# Patient Record
Sex: FEMALE | HISPANIC OR LATINO | Employment: UNEMPLOYED | URBAN - METROPOLITAN AREA
[De-identification: names, ages, dates, MRNs, and addresses within clinical notes are randomized per-mention and may not be internally consistent; named-entity substitution may affect disease eponyms.]

---

## 2024-01-24 ENCOUNTER — INITIAL PRENATAL (OUTPATIENT)
Dept: OBSTETRICS AND GYNECOLOGY | Facility: CLINIC | Age: 39
End: 2024-01-24
Payer: COMMERCIAL

## 2024-01-24 VITALS — SYSTOLIC BLOOD PRESSURE: 122 MMHG | DIASTOLIC BLOOD PRESSURE: 68 MMHG | WEIGHT: 174 LBS

## 2024-01-24 DIAGNOSIS — E07.9 THYROID DYSFUNCTION IN PREGNANCY, ANTEPARTUM: ICD-10-CM

## 2024-01-24 DIAGNOSIS — O99.280 THYROID DYSFUNCTION IN PREGNANCY, ANTEPARTUM: ICD-10-CM

## 2024-01-24 DIAGNOSIS — K29.50 CHRONIC GASTRITIS WITHOUT BLEEDING, UNSPECIFIED GASTRITIS TYPE: ICD-10-CM

## 2024-01-24 DIAGNOSIS — Z36.0 ENCOUNTER FOR ANTENATAL SCREENING FOR CHROMOSOMAL ANOMALIES: ICD-10-CM

## 2024-01-24 DIAGNOSIS — O09.519 ADVANCED MATERNAL AGE, PRIMIGRAVIDA, ANTEPARTUM: ICD-10-CM

## 2024-01-24 DIAGNOSIS — Z11.3 SCREEN FOR SEXUALLY TRANSMITTED DISEASES: ICD-10-CM

## 2024-01-24 DIAGNOSIS — Z13.71 SCREENING FOR GENETIC DISEASE CARRIER STATUS: ICD-10-CM

## 2024-01-24 DIAGNOSIS — N91.2 ABSENT MENSES: Primary | ICD-10-CM

## 2024-01-24 DIAGNOSIS — O21.9 NAUSEA AND VOMITING DURING PREGNANCY: ICD-10-CM

## 2024-01-24 DIAGNOSIS — Z12.4 SCREENING FOR MALIGNANT NEOPLASM OF CERVIX: ICD-10-CM

## 2024-01-24 LAB
B-HCG UR QL: POSITIVE
EXPIRATION DATE: ABNORMAL
INTERNAL NEGATIVE CONTROL: ABNORMAL
INTERNAL POSITIVE CONTROL: ABNORMAL
Lab: ABNORMAL

## 2024-01-24 RX ORDER — LEVOTHYROXINE SODIUM 0.1 MG/1
100 TABLET ORAL DAILY
COMMUNITY

## 2024-01-24 RX ORDER — OMEPRAZOLE 20 MG/1
20 CAPSULE, DELAYED RELEASE ORAL DAILY
Qty: 30 CAPSULE | Refills: 5 | Status: SHIPPED | OUTPATIENT
Start: 2024-01-24

## 2024-01-24 RX ORDER — DOXYLAMINE SUCCINATE AND PYRIDOXINE HYDROCHLORIDE, DELAYED RELEASE TABLETS 10 MG/10 MG 10; 10 MG/1; MG/1
1 TABLET, DELAYED RELEASE ORAL 2 TIMES DAILY
Qty: 60 TABLET | Refills: 2 | Status: SHIPPED | OUTPATIENT
Start: 2024-01-24

## 2024-01-24 NOTE — PROGRESS NOTES
Chief Complaint  Initial Prenatal Visit (Patient is here for initial prenatal 11w5d, LMP 11/3/23,MARIELENA 8/9/24)    Entirety of today's encounter including patient interview, exam, and counseling performed with the aid of a medical  via the telephone.     Krunal Dinero presents to [facility]  History of Present Illness  Patient here for initial prenatal visit.  LMP was 11/3/2023.  She reports having some nausea with occasional episodes of emesis.  She reports a previous history of gastritis, and she says she feels this is making the nausea worse.  She has tried taking Mylanta at home.  She has also had some mild pelvic cramping every now and then.  She also states that about 2-3 days ago she had a very brief and scant episode of spotting.  She is otherwise without complaints.    This was the patient's first pregnancy.    Past Medical History:   Diagnosis Date    Hypothyroidism      Past Surgical History:   Procedure Laterality Date    OVARIAN CYST SURGERY Left      Social History     Tobacco Use    Smoking status: Never    Smokeless tobacco: Never   Vaping Use    Vaping Use: Never used   Substance Use Topics    Alcohol use: Never    Drug use: Never     Family History   Problem Relation Age of Onset    Ovarian cancer Mother     Colon cancer Mother     Breast cancer Neg Hx     Uterine cancer Neg Hx      Current Outpatient Medications on File Prior to Visit   Medication Sig    levothyroxine (SYNTHROID, LEVOTHROID) 100 MCG tablet Take 1 tablet by mouth Daily.     No current facility-administered medications on file prior to visit.     No Known Allergies    Review of systems:  Constitutional: No fevers, chills, sweats   Eye: No recent visual problems, denies blurry vision   HEENT: No ear pain, nasal congestion, sore throat, voice changes   Respiratory: No shortness of breath, cough, pain on breathing   Cardiovascular: No Chest pain,  palpitations   Gastrointestinal: Pos nausea, vomiting; no diarrhea, constipation   Genitourinary: No hematuria, dysuria, lesions on genitalia   Hema/Lymph: Negative for bruising, no edema   Endocrine: Negative for excessive thirst, excessive hunger, heat or cold intolerance   Musculoskeletal: No joint pain, muscle pain, decreased range of motion   Integumentary: No rash, pruritus, abrasions, lesions   Neurologic: No weakness, numbness, headaches   Psychiatric: No anxiety, depression, mood changes         Objective   Vital Signs:  /68   Wt 78.9 kg (174 lb)   There is no height or weight on file to calculate BMI.             Physical Exam   Exam performed in the presence of a female chaperone  Patient has provided verbal consent to proceed with exam.    Gen: No acute distress, awake and oriented times three  HENT: Normocephalic, atraumatic, Moist mucous membranes  Eyes: PERRLA, EOMI  Lungs: Normal work of breathing, lungs clear bilaterally  Abdomen: soft, nontender, no masses or hernia, no hepatosplenomegaly, non distended, normoactive bowel sounds  Normal external female genitalia, no lesions  Urethra: Normal meatus, no caruncle  Bladder: nontender  Vagina: No blood or discharge  Cervix: No cervical motion tenderness, no lesions, no active bleeding, nonfriable  Uterus: Anteverted, normal size and shape, nontender  Adnexa: No masses or tenderness  External anal exam: Normal appearance, no lesions or hemorrhoids  Rectal: Deferred  Skin: Warm and dry, no rashes  Psych: Good judgement and insight, normal affect and mood  Neuro: CN 2-12 intact, no gross deficits    Result Review :            Initial Prenatal on 01/24/2024   Component Date Value Ref Range Status    HCG, Urine, QL 01/24/2024 Positive (A)  Negative Final    Lot Number 01/24/2024 [id number]   Final    Internal Positive Control 01/24/2024 Passed  Positive, Passed Final    Internal Negative Control 01/24/2024 Passed  Negative, Passed Final    Expiration  Date 2024   Final              Assessment and Plan     Diagnoses and all orders for this visit:    1. Absent menses (Primary)  -     POC Pregnancy, Urine    Initial prenatal counseling performed today. Educational handouts on prenatal care provided to patient. Discussed frequency of visits, lab testing, OTC medications, physical activity, exercise, dietary restrictions, potential exposures (COVID, Zika, Toxo, etc). Hospital and call coverage system discussed. Pt is recommended to start PNV.     Obtain US next available for dating. Routine labs today with Pap, cultures, and OB profile panel.      2. Screening for malignant neoplasm of cervix  -     IGP,CtNgTv,Apt HPV,rfx 16 / 18,45    3. Screen for sexually transmitted diseases  -     OB Panel With HIV  -     IGP,CtNgTv,Apt HPV,rfx 16 / 18,45    4. Screening for genetic disease carrier status  -     Hemoglobinopathy Fractionation [place]  -     OB Panel With HIV  -     Inheritest Core Panel (CF97,SMA,FraX) - Blood,    5. Encounter for  screening for chromosomal anomalies  -     [id number] PLUS Core+SCA+ESS - Blood,    6. Advanced maternal age, primigravida, antepartum  -     Hemoglobinopathy Fractionation [place]  -     US Ob Transvaginal; Future  -     OB Panel With HIV  -     Urine Culture - Urine, Urine, Clean Catch  -     Urine Drug Screen - Urine, Clean Catch  -     Inheritest Core Panel (CF97,SMA,FraX) - Blood,  -     [id number] PLUS Core+SCA+ESS - Blood,    I discussed with patient the risks of increasing complications with advancing maternal age.  We discussed higher rates of miscarriage, birth defects, chromosomal abnormalities,  labor,  delivery, medical complications of pregnancy such as gestational diabetes and hypertension,  section, severe maternal morbidity, and increased rates of maternal mortality.  We discussed methods of screening for chromosomal aneuploidy including targeted ultrasound, first  trimester serum screening, sequential serum screening, cell free DNA testing, and invasive diagnostic methods such as chorionic villous sampling and amniocentesis.  We also discussed the option of no screening.  Patient requests NIPT.     7. Thyroid dysfunction in pregnancy, antepartum  -     TSH  -     T4, Free    We will check thyroid studies at least every trimester as long as stable.  Advised to continue Synthroid for now.  Repeat testing today.    8. Nausea and vomiting during pregnancy  -     doxylamine-pyridoxine (DICLEGIS) 10-10 MG tablet delayed-release EC tablet; Take 1 tablet by mouth 2 (Two) Times a Day.  Dispense: 60 tablet; Refill: 2    Discussed nausea and vomiting in pregnancy. Discussed diet and lifestyle modifications to help prevent nausea. Discussed use of vitamin B6 and doxylamine up to three times a day as first line pharmacologic therapy.  Given her history of gastritis would also recommend daily proton pump inhibitor.  Will send prescription to pharmacy. Weight gain expectations discussed with pt.    9. Chronic gastritis without bleeding, unspecified gastritis type  -     omeprazole (priLOSEC) 20 MG capsule; Take 1 capsule by mouth Daily.  Dispense: 30 capsule; Refill: 5             Follow Up     Return US next avail dating; OB FU 4 wks.  Patient was given instructions and counseling regarding her condition or for health maintenance advice. Please see specific information pulled into the AVS if appropriate.

## 2024-01-25 LAB
T4 FREE SERPL-MCNC: 1.39 NG/DL (ref 0.93–1.7)
TSH SERPL DL<=0.005 MIU/L-ACNC: 3.82 UIU/ML (ref 0.27–4.2)

## 2024-01-26 ENCOUNTER — TELEPHONE (OUTPATIENT)
Dept: OBSTETRICS AND GYNECOLOGY | Facility: CLINIC | Age: 39
End: 2024-01-26

## 2024-01-26 LAB
AMPHETAMINES UR QL SCN: NEGATIVE NG/ML
BACTERIA UR CULT: NO GROWTH
BACTERIA UR CULT: NORMAL
BARBITURATES UR QL SCN: NEGATIVE NG/ML
BENZODIAZ UR QL SCN: NEGATIVE NG/ML
BZE UR QL SCN: NEGATIVE NG/ML
CANNABINOIDS UR QL SCN: NEGATIVE NG/ML
CREAT UR-MCNC: 19.8 MG/DL (ref 20–300)
LABORATORY COMMENT REPORT: ABNORMAL
METHADONE UR QL SCN: NEGATIVE NG/ML
OPIATES UR QL SCN: NEGATIVE NG/ML
OXYCODONE+OXYMORPHONE UR QL SCN: NEGATIVE NG/ML
PCP UR QL: NEGATIVE NG/ML
PH UR: 5.5 [PH] (ref 4.5–8.9)
PROPOXYPH UR QL SCN: NEGATIVE NG/ML
SP GR UR: 1

## 2024-01-28 LAB
C TRACH RRNA CVX QL NAA+PROBE: NEGATIVE
CYTOLOGIST CVX/VAG CYTO: NORMAL
CYTOLOGY CVX/VAG DOC CYTO: NORMAL
CYTOLOGY CVX/VAG DOC THIN PREP: NORMAL
DX ICD CODE: NORMAL
HIV 1 & 2 AB SER-IMP: NORMAL
HPV GENOTYPE REFLEX: NORMAL
HPV I/H RISK 4 DNA CVX QL PROBE+SIG AMP: NEGATIVE
Lab: NORMAL
N GONORRHOEA RRNA CVX QL NAA+PROBE: NEGATIVE
OTHER STN SPEC: NORMAL
STAT OF ADQ CVX/VAG CYTO-IMP: NORMAL
T VAGINALIS RRNA SPEC QL NAA+PROBE: NEGATIVE

## 2024-01-29 ENCOUNTER — TELEPHONE (OUTPATIENT)
Dept: OBSTETRICS AND GYNECOLOGY | Facility: CLINIC | Age: 39
End: 2024-01-29
Payer: COMMERCIAL

## 2024-01-29 NOTE — TELEPHONE ENCOUNTER
"Pt's pharmacy is requiring a PA for this medicine \"doxylamine-pyridoxine (DICLEGIS) 10-10 MG tablet delayed-release EC tablet \"    Please advise  ~Gem  "

## 2024-01-30 ENCOUNTER — DOCUMENTATION (OUTPATIENT)
Dept: OBSTETRICS AND GYNECOLOGY | Facility: CLINIC | Age: 39
End: 2024-01-30
Payer: COMMERCIAL

## 2024-02-06 LAB
5P15 DELETION (CRI-DU-CHAT): NOT DETECTED
ABO GROUP BLD: NORMAL
BASOPHILS # BLD AUTO: 0 X10E3/UL (ref 0–0.2)
BASOPHILS NFR BLD AUTO: 1 %
BLD GP AB SCN SERPL QL: NEGATIVE
CFDNA.FET/CFDNA.TOTAL SFR FETUS: NORMAL %
CITATION REF LAB TEST: NORMAL
CITATION REF LAB TEST: NORMAL
EOSINOPHIL # BLD AUTO: 0.1 X10E3/UL (ref 0–0.4)
EOSINOPHIL NFR BLD AUTO: 1 %
ERYTHROCYTE [DISTWIDTH] IN BLOOD BY AUTOMATED COUNT: 13.1 % (ref 11.7–15.4)
FET 13+18+21+X+Y ANEUP PLAS.CFDNA: NEGATIVE
FET 1P36 DEL RISK WBC.DNA+CFDNA QL: NOT DETECTED
FET 22Q11.2 DEL RISK WBC.DNA+CFDNA QL: NOT DETECTED
FET CHR 11Q23 DEL PLAS.CFDNA QL: NOT DETECTED
FET CHR 15Q11 DEL PLAS.CFDNA QL: NOT DETECTED
FET CHR 21 TS PLAS.CFDNA QL: NEGATIVE
FET CHR 4P16 DEL PLAS.CFDNA QL: NOT DETECTED
FET CHR 8Q24 DEL PLAS.CFDNA QL: NOT DETECTED
FET MS X RISK WBC.DNA+CFDNA QL: NOT DETECTED
FET SEX PLAS.CFDNA DOSAGE CFDNA: NORMAL
FET TS 13 RISK PLAS.CFDNA QL: NEGATIVE
FET TS 18 RISK WBC.DNA+CFDNA QL: NEGATIVE
FET X + Y ANEUP RISK PLAS.CFDNA SEQ-IMP: NOT DETECTED
GA EST FROM CONCEPTION DATE: NORMAL D
GENDER IDENTITY: NORMAL
GENE DIS ANL CARRIER INTERP-IMP: NORMAL
GENE STUDIED ID: NORMAL
GENETIC SCN SPEC: NORMAL
GESTATIONAL AGE > 9:: YES
HBV SURFACE AG SERPL QL IA: NEGATIVE
HCT VFR BLD AUTO: 40.3 % (ref 34–46.6)
HCV IGG SERPL QL IA: NON REACTIVE
HGB A MFR BLD ELPH: 97.6 % (ref 96.4–98.8)
HGB A2 MFR BLD ELPH: 2.4 % (ref 1.8–3.2)
HGB BLD-MCNC: 13.4 G/DL (ref 11.1–15.9)
HGB F MFR BLD ELPH: 0 % (ref 0–2)
HGB FRACT BLD-IMP: NORMAL
HGB S MFR BLD ELPH: 0 %
HIV 1+2 AB+HIV1 P24 AG SERPL QL IA: NON REACTIVE
IMM GRANULOCYTES # BLD AUTO: 0 X10E3/UL (ref 0–0.1)
IMM GRANULOCYTES NFR BLD AUTO: 1 %
LAB DIRECTOR NAME PROVIDER: NORMAL
LABORATORY COMMENT REPORT: NORMAL
LIMITATIONS OF THE TEST: NORMAL
LYMPHOCYTES # BLD AUTO: 2 X10E3/UL (ref 0.7–3.1)
LYMPHOCYTES NFR BLD AUTO: 23 %
Lab: NORMAL
MCH RBC QN AUTO: 28.9 PG (ref 26.6–33)
MCHC RBC AUTO-ENTMCNC: 33.3 G/DL (ref 31.5–35.7)
MCV RBC AUTO: 87 FL (ref 79–97)
MONOCYTES # BLD AUTO: 0.7 X10E3/UL (ref 0.1–0.9)
MONOCYTES NFR BLD AUTO: 8 %
NEGATIVE PREDICTIVE VALUE: NORMAL
NEUTROPHILS # BLD AUTO: 5.7 X10E3/UL (ref 1.4–7)
NEUTROPHILS NFR BLD AUTO: 66 %
NOTE: NORMAL
PERFORMANCE CHARACTERISTICS: NORMAL
PLATELET # BLD AUTO: 297 X10E3/UL (ref 150–450)
POSITIVE PREDICTIVE VALUE: NORMAL
RBC # BLD AUTO: 4.63 X10E6/UL (ref 3.77–5.28)
REASON FOR REFERRAL (NARRATIVE): NORMAL
RECOMMENDATION PATIENT DOC-IMP: NORMAL
REF LAB TEST METHOD: NORMAL
REF LAB TEST METHOD: NORMAL
RH BLD: NEGATIVE
RPR SER QL: NON REACTIVE
RUBV IGG SERPL IA-ACNC: 5.34 INDEX
SERVICE CMNT-IMP: NORMAL
SPECIMEN SOURCE: NORMAL
TEST PERFORMANCE INFO SPEC: NORMAL
TRIOSOMY 16: NOT DETECTED
TRISOMY 22: NOT DETECTED
WBC # BLD AUTO: 8.5 X10E3/UL (ref 3.4–10.8)

## 2024-02-16 ENCOUNTER — ROUTINE PRENATAL (OUTPATIENT)
Dept: OBSTETRICS AND GYNECOLOGY | Facility: CLINIC | Age: 39
End: 2024-02-16
Payer: COMMERCIAL

## 2024-02-16 VITALS — WEIGHT: 180 LBS | DIASTOLIC BLOOD PRESSURE: 71 MMHG | SYSTOLIC BLOOD PRESSURE: 109 MMHG

## 2024-02-16 DIAGNOSIS — O09.519 ADVANCED MATERNAL AGE, PRIMIGRAVIDA, ANTEPARTUM: ICD-10-CM

## 2024-02-16 DIAGNOSIS — Z67.91 RH NEGATIVE, ANTEPARTUM: ICD-10-CM

## 2024-02-16 DIAGNOSIS — Z3A.10 10 WEEKS GESTATION OF PREGNANCY: Primary | ICD-10-CM

## 2024-02-16 DIAGNOSIS — O26.899 RH NEGATIVE, ANTEPARTUM: ICD-10-CM

## 2024-02-16 DIAGNOSIS — E07.9 THYROID DYSFUNCTION IN PREGNANCY, ANTEPARTUM: ICD-10-CM

## 2024-02-16 DIAGNOSIS — O99.280 THYROID DYSFUNCTION IN PREGNANCY, ANTEPARTUM: ICD-10-CM

## 2024-02-16 LAB
BILIRUB BLD-MCNC: NEGATIVE MG/DL
CLARITY, POC: CLEAR
COLOR UR: YELLOW
GLUCOSE UR STRIP-MCNC: NEGATIVE MG/DL
KETONES UR QL: NEGATIVE
LEUKOCYTE EST, POC: ABNORMAL
NITRITE UR-MCNC: NEGATIVE MG/ML
PH UR: 5.5 [PH] (ref 5–8)
PROT UR STRIP-MCNC: NEGATIVE MG/DL
RBC # UR STRIP: ABNORMAL /UL
SP GR UR: 1.03 (ref 1–1.03)
UROBILINOGEN UR QL: ABNORMAL

## 2024-02-18 LAB
BACTERIA UR CULT: NORMAL
BACTERIA UR CULT: NORMAL